# Patient Record
Sex: MALE | Race: BLACK OR AFRICAN AMERICAN | NOT HISPANIC OR LATINO | Employment: UNEMPLOYED | URBAN - METROPOLITAN AREA
[De-identification: names, ages, dates, MRNs, and addresses within clinical notes are randomized per-mention and may not be internally consistent; named-entity substitution may affect disease eponyms.]

---

## 2017-04-25 ENCOUNTER — HOSPITAL ENCOUNTER (EMERGENCY)
Facility: HOSPITAL | Age: 47
Discharge: HOME | End: 2017-04-25
Attending: EMERGENCY MEDICINE
Payer: MEDICAID

## 2017-04-25 VITALS
BODY MASS INDEX: 34.53 KG/M2 | DIASTOLIC BLOOD PRESSURE: 84 MMHG | HEIGHT: 67 IN | SYSTOLIC BLOOD PRESSURE: 156 MMHG | HEART RATE: 58 BPM | RESPIRATION RATE: 16 BRPM | TEMPERATURE: 98 F | WEIGHT: 220 LBS | OXYGEN SATURATION: 98 %

## 2017-04-25 DIAGNOSIS — M54.31 BILATERAL SCIATICA: ICD-10-CM

## 2017-04-25 DIAGNOSIS — M54.32 BILATERAL SCIATICA: ICD-10-CM

## 2017-04-25 DIAGNOSIS — S39.012A LUMBAR STRAIN, INITIAL ENCOUNTER: Primary | ICD-10-CM

## 2017-04-25 DIAGNOSIS — M54.50 LOWER BACK PAIN: ICD-10-CM

## 2017-04-25 PROCEDURE — 96372 THER/PROPH/DIAG INJ SC/IM: CPT

## 2017-04-25 PROCEDURE — 99284 EMERGENCY DEPT VISIT MOD MDM: CPT | Mod: 25

## 2017-04-25 PROCEDURE — 63600175 PHARM REV CODE 636 W HCPCS: Performed by: EMERGENCY MEDICINE

## 2017-04-25 RX ORDER — MELOXICAM 15 MG/1
15 TABLET ORAL DAILY
Qty: 7 TABLET | Refills: 0 | Status: SHIPPED | OUTPATIENT
Start: 2017-04-25 | End: 2017-05-02

## 2017-04-25 RX ORDER — METHOCARBAMOL 750 MG/1
1500 TABLET, FILM COATED ORAL EVERY 8 HOURS PRN
Qty: 20 TABLET | Refills: 0 | Status: SHIPPED | OUTPATIENT
Start: 2017-04-25 | End: 2017-05-05

## 2017-04-25 RX ORDER — KETOROLAC TROMETHAMINE 30 MG/ML
30 INJECTION, SOLUTION INTRAMUSCULAR; INTRAVENOUS
Status: COMPLETED | OUTPATIENT
Start: 2017-04-25 | End: 2017-04-25

## 2017-04-25 RX ORDER — TRAMADOL HYDROCHLORIDE 50 MG/1
50 TABLET ORAL EVERY 8 HOURS PRN
Qty: 20 TABLET | Refills: 0 | Status: SHIPPED | OUTPATIENT
Start: 2017-04-25 | End: 2017-04-28

## 2017-04-25 RX ADMIN — KETOROLAC TROMETHAMINE 30 MG: 30 INJECTION, SOLUTION INTRAMUSCULAR at 04:04

## 2017-04-25 NOTE — ED AVS SNAPSHOT
[facility]  [place] [id number]               Joseph Lai   2017  3:21 PM   ED    Description:  Male : 1970   Department:  [facility]           Your Care was Coordinated By:     Provider Role From To    Mike Pandya MD Attending Provider 17 1522 --      Reason for Visit     Back Pain     Letter for School/Work           Diagnoses this Visit        Comments    Lumbar strain, initial encounter    -  Primary     Lower back pain         Bilateral sciatica           ED Disposition     None           To Do List           Follow-up Information     Schedule an appointment as soon as possible for a visit with [facility] care physician/[facility].       These Medications        Disp Refills Start End    meloxicam (MOBIC) 15 MG tablet 7 tablet 0 2017    Take 1 tablet (15 mg total) by mouth once daily. - Oral    tramadol (ULTRAM) 50 mg tablet 20 tablet 0 2017    Take 1 tablet (50 mg total) by mouth every 8 (eight) hours as needed for Pain. - Oral    methocarbamol (ROBAXIN) 750 MG Tab 20 tablet 0 2017    Take 2 tablets (1,500 mg total) by mouth every 8 (eight) hours as needed (muscle spasm). - Oral      [facility]     [facility] -  Assistance  Unless otherwise directed by your provider, please contact [facility], our nurse care line that is available for  assistance.     Registered nurses in the [facility] provide: appointment scheduling, clinical advisement, health education, and other advisory services.  Call: [phone] (toll free)               Medications           Message regarding Medications     Verify the changes and/or additions to your medication regime listed below are the same as discussed with your clinician today.  If any of these changes or additions are incorrect, please notify your healthcare provider.    "     START taking these NEW medications        Refills    meloxicam (MOBIC) 15 MG tablet 0    Sig: Take 1 tablet (15 mg total) by mouth once daily.    Class: Print    Route: Oral    tramadol (ULTRAM) 50 mg tablet 0    Sig: Take 1 tablet (50 mg total) by mouth every 8 (eight) hours as needed for Pain.    Class: Print    Route: Oral    methocarbamol (ROBAXIN) 750 MG Tab 0    Sig: Take 2 tablets (1,500 mg total) by mouth every 8 (eight) hours as needed (muscle spasm).    Class: Print    Route: Oral      These medications were administered today        Dose Freq    ketorolac injection 30 mg 30 mg ED 1 Time    Sig: Inject 30 mg into the muscle ED 1 Time.    Class: Normal    Route: Intramuscular      STOP taking these medications     azithromycin (Z-ALANNA) 250 MG tablet Take 2 tablets by mouth on day 1; Take 1 tablet by mouth on days 2-5           Verify that the below list of medications is an accurate representation of the medications you are currently taking.  If none reported, the list may be blank. If incorrect, please contact your healthcare provider. Carry this list with you in case of emergency.           Current Medications     albuterol 90 mcg/actuation inhaler Inhale 1-2 puffs into the lungs every 4 (four) hours as needed for Wheezing or Shortness of Breath.    ketorolac injection 30 mg Inject 30 mg into the muscle ED 1 Time.    meloxicam (MOBIC) 15 MG tablet Take 1 tablet (15 mg total) by mouth once daily.    methocarbamol (ROBAXIN) 750 MG Tab Take 2 tablets (1,500 mg total) by mouth every 8 (eight) hours as needed (muscle spasm).    tramadol (ULTRAM) 50 mg tablet Take 1 tablet (50 mg total) by mouth every 8 (eight) hours as needed for Pain.           Clinical Reference Information           Your Vitals Were     BP Pulse Temp Resp Height Weight    156/84 (BP Location: Right arm, Patient Position: Sitting) 58 98 °F (36.7 °C) (Oral) 16 5' 7" (1.702 m) 99.8 kg (220 lb)    SpO2 BMI             98% 34.46 kg/m2       "   Allergies as of 4/25/2017     No Known Allergies      Immunizations Administered on Date of Encounter - 4/25/2017     None      ED Micro, Lab, POCT     None      ED Imaging Orders     Start Ordered       Status Ordering Provider    04/25/17 1531 04/25/17 1530  X-Ray Lumbar Spine Ap And Lateral  1 time imaging      In process       Discharge References/Attachments     BACK PAIN (ACUTE OR CHRONIC) (ENGLISH)    SCIATICA (ENGLISH)      [facility] Sign-Up     Activating your [facility] account is as easy as 1-2-3!     1) Visit [email or web address], select Sign Up Now, enter this activation code and your date of birth, then select Next.  [email or web address]  Expires: 6/9/2017  3:55 PM      2) Create a username and password to use when you visit [facility] in the future and select a security question in case you lose your password and select Next.    3) Enter your e-mail address and click Sign Up!    Additional Information  If you have questions, please e-mail [email or web address] or call [phone] to talk to our [facility] staff. Remember, [facility] is NOT to be used for urgent needs. For medical emergencies, dial 911.         Smoking Cessation     If you would like to quit smoking:   You may be eligible for free services if you are a [place] resident and started smoking cigarettes before September 1, 1988.  Call the Smoking Cessation Trust (SCT) toll free at (080) 744-8392 or (808) 252-4961.   Call 0-876-QUIT-NOW if you do not meet the above criteria.   Contact us via email: [email or web address]   View our website for more information: [email or web address]         [facility] complies with applicable Federal civil rights laws and does not discriminate on the basis of race, color, national origin, age, disability, or sex.        Language Assistance Services     ATTENTION: Language assistance services are available, free of charge. Please call [phone].      ATENCIÓN: Kim rutherford  tiene a cosby disposición servicios gratuitos de asistencia lingüística. Llame al [phone].     PERNELL Ý: N?u b?n nói Ti?ng Vi?t, có các d?ch v? h? tr? ngôn ng? mi?n phí jeffh cho b?n. G?i s? [phone].

## 2017-04-25 NOTE — ED PROVIDER NOTES
Encounter Date: 4/25/2017       History     Chief Complaint   Patient presents with    Back Pain     just started working for the [employer] and is having lower back pain now.. denies heavy lifting    Letter for School/Work     would like a work excuse also     Review of patient's allergies indicates:  No Known Allergies  Patient is a 46 y.o. male presenting with the following complaint: back pain. The history is provided by the patient.   Back Pain    This is a new problem. The problem occurs constantly. The pain is associated with no known injury. The quality of the pain is described as stabbing. Radiates to: posterior thighs  The pain is at a severity of 7/10. The symptoms are aggravated by bending and twisting. The pain is the same all the time. Pertinent negatives include no chest pain, no fever, no numbness, no weight loss, no headaches, no abdominal pain, no bowel incontinence, no perianal numbness, no paresthesias and no weakness. He has tried NSAIDs for the symptoms. The treatment provided no relief.     History reviewed. No pertinent past medical history.  Past Surgical History:   Procedure Laterality Date    HERNIA REPAIR       History reviewed. No pertinent family history.  Social History   Substance Use Topics    Smoking status: Current Every Day Smoker     Packs/day: 2.00     Types: Cigarettes    Smokeless tobacco: None    Alcohol use Yes      Comment: socially     Review of Systems   Constitutional: Negative for diaphoresis, fatigue, fever and weight loss.   Respiratory: Negative for shortness of breath.    Cardiovascular: Negative for chest pain.   Gastrointestinal: Negative for abdominal distention, abdominal pain, bowel incontinence, diarrhea, nausea and vomiting.   Musculoskeletal: Positive for back pain. Negative for neck pain and neck stiffness.   Allergic/Immunologic: Negative for immunocompromised state.   Neurological: Negative for seizures, weakness, numbness, headaches and paresthesias.    Hematological: Does not bruise/bleed easily.   Psychiatric/Behavioral: Negative for confusion.   All other systems reviewed and are negative.        Physical Exam   Initial Vitals   BP Pulse Resp Temp SpO2   17 1511 17 1511 17 1511 17 1511 17 1511   156/84 58 16 98 °F (36.7 °C) 98 %     Physical Exam    Nursing note and vitals reviewed.  Constitutional: He appears well-developed and well-nourished.   HENT:   Head: Normocephalic and atraumatic.   Eyes: Conjunctivae are normal. No scleral icterus.   Neck: Normal range of motion. Neck supple.   Cardiovascular: Normal rate and regular rhythm.   Pulmonary/Chest: Breath sounds normal. No stridor.   Abdominal: Soft. He exhibits no distension. There is no tenderness. There is no rebound and no guarding.   Musculoskeletal: Normal range of motion. He exhibits no edema or tenderness.        Back:         Arms:  Neurological: He is alert and oriented to person, place, and time. He has normal strength.   Skin: Skin is warm and dry.   Psychiatric: He has a normal mood and affect. Thought content normal.         ED Course   Procedures  Labs Reviewed - No data to display        Medical Decision Making:   History:   Old Medical Records: I decided to obtain old medical records.  Old Records Summarized: other records.  Independently Interpreted Test(s):   I have ordered and independently interpreted X-rays - see prior notes.  Clinical Tests:   Lab Tests: Reviewed  Radiological Study: Reviewed and Ordered    Differential Diagnosis:   Initial differential diagnosis includes but is not limited to the following... muscle spasm vs strain, sciatica, epidural abscess, unreported trauma resulting in soft tissue contusion or fracture; malingering.     Additional Medical Decision Makin-year-old male with no significant past medical history presents the emergency department complaining of low back pain radiating down posterior bilateral legs for the past  several days.  He denies any specific injury.  On exam, he has midline lumbar tenderness at L4-5 positive bilateral straight leg.  X-rays lumbar spine without evidence of acute fracture and/or subluxation.  Findings at this time are most consistent with sciatica.  He has been given IM Toradol for symptomatic relief  prior to being discharged home on Robaxin, mobic and ultram prn.  He has been advised to follow-up with his PCP within the week.  Return instructions discussed prior to discharge.                     ED Course     Clinical Impression:   The encounter diagnosis was Lower back pain.    Disposition:   Disposition: Discharged       Mike Pandya MD  04/25/17 1965

## 2017-05-09 ENCOUNTER — HOSPITAL ENCOUNTER (EMERGENCY)
Facility: HOSPITAL | Age: 47
Discharge: HOME | End: 2017-05-09
Attending: EMERGENCY MEDICINE
Payer: MEDICAID

## 2017-05-09 VITALS
RESPIRATION RATE: 18 BRPM | DIASTOLIC BLOOD PRESSURE: 87 MMHG | SYSTOLIC BLOOD PRESSURE: 141 MMHG | OXYGEN SATURATION: 96 % | WEIGHT: 220 LBS | TEMPERATURE: 98 F | BODY MASS INDEX: 33.34 KG/M2 | HEIGHT: 68 IN | HEART RATE: 89 BPM

## 2017-05-09 DIAGNOSIS — M54.30 BACK PAIN WITH SCIATICA: Primary | ICD-10-CM

## 2017-05-09 DIAGNOSIS — M54.9 BACK PAIN WITH SCIATICA: Primary | ICD-10-CM

## 2017-05-09 PROCEDURE — 99283 EMERGENCY DEPT VISIT LOW MDM: CPT | Mod: 25

## 2017-05-09 PROCEDURE — 96372 THER/PROPH/DIAG INJ SC/IM: CPT

## 2017-05-09 PROCEDURE — 63600175 PHARM REV CODE 636 W HCPCS: Performed by: EMERGENCY MEDICINE

## 2017-05-09 RX ORDER — TRIAMCINOLONE ACETONIDE 40 MG/ML
80 INJECTION, SUSPENSION INTRA-ARTICULAR; INTRAMUSCULAR
Status: COMPLETED | OUTPATIENT
Start: 2017-05-09 | End: 2017-05-09

## 2017-05-09 RX ORDER — TRAMADOL HYDROCHLORIDE 50 MG/1
50 TABLET ORAL EVERY 8 HOURS PRN
Qty: 20 TABLET | Refills: 0 | Status: SHIPPED | OUTPATIENT
Start: 2017-05-09 | End: 2017-05-12

## 2017-05-09 RX ORDER — KETOROLAC TROMETHAMINE 30 MG/ML
30 INJECTION, SOLUTION INTRAMUSCULAR; INTRAVENOUS
Status: COMPLETED | OUTPATIENT
Start: 2017-05-09 | End: 2017-05-09

## 2017-05-09 RX ORDER — ORPHENADRINE CITRATE 100 MG/1
100 TABLET, EXTENDED RELEASE ORAL 2 TIMES DAILY PRN
Qty: 20 TABLET | Refills: 0 | Status: SHIPPED | OUTPATIENT
Start: 2017-05-09 | End: 2017-05-19

## 2017-05-09 RX ADMIN — TRIAMCINOLONE ACETONIDE 80 MG: 40 INJECTION, SUSPENSION INTRA-ARTICULAR; INTRAMUSCULAR at 02:05

## 2017-05-09 RX ADMIN — KETOROLAC TROMETHAMINE 30 MG: 30 INJECTION, SOLUTION INTRAMUSCULAR at 02:05

## 2017-05-09 NOTE — ED AVS SNAPSHOT
[facility]  [address] [place] [address]               Joseph Lai   2017  1:46 PM   ED    Description:  Male : 1970   Department:  [facility]           Your Care was Coordinated By:     Provider Role From To    Mike Pandya MD Attending Provider 17 1358 17 1402    MARTIN Erickson Physician Assistant 17 1358 17 1401      Reason for Visit     Back Pain           Diagnoses this Visit        Comments    Back pain with sciatica    -  Primary       ED Disposition     ED Disposition Condition Comment    Discharge             To Do List           Follow-up Information     Schedule an appointment as soon as possible for a visit with [facility] care physician/[facility].       These Medications        Disp Refills Start End    orphenadrine (NORFLEX) 100 mg tablet 20 tablet 0 2017    Take 1 tablet (100 mg total) by mouth 2 (two) times daily as needed for Muscle spasms. - Oral    tramadol (ULTRAM) 50 mg tablet 20 tablet 0 2017    Take 1 tablet (50 mg total) by mouth every 8 (eight) hours as needed for Pain. - Oral      [facility]     [facility] Care [facility] -  Assistance  Unless otherwise directed by your provider, please contact [facility], our nurse care line that is available for  assistance.     Registered nurses in the [facility] provide: appointment scheduling, clinical advisement, health education, and other advisory services.  Call: [phone] (toll free)               Medications           Message regarding Medications     Verify the changes and/or additions to your medication regime listed below are the same as discussed with your clinician today.  If any of these changes or additions are incorrect, please notify your healthcare provider.        START taking these NEW medications        Refills    orphenadrine (NORFLEX) 100  "mg tablet 0    Sig: Take 1 tablet (100 mg total) by mouth 2 (two) times daily as needed for Muscle spasms.    Class: Print    Route: Oral    tramadol (ULTRAM) 50 mg tablet 0    Sig: Take 1 tablet (50 mg total) by mouth every 8 (eight) hours as needed for Pain.    Class: Print    Route: Oral      These medications were administered today        Dose Freq    triamcinolone acetonide injection 80 mg 80 mg ED 1 Time    Sig: Inject 2 mLs (80 mg total) into the muscle ED 1 Time.    Class: Normal    Route: Intramuscular    ketorolac injection 30 mg 30 mg ED 1 Time    Sig: Inject 30 mg into the muscle ED 1 Time.    Class: Normal    Route: Intramuscular           Verify that the below list of medications is an accurate representation of the medications you are currently taking.  If none reported, the list may be blank. If incorrect, please contact your healthcare provider. Carry this list with you in case of emergency.           Current Medications     albuterol 90 mcg/actuation inhaler Inhale 1-2 puffs into the lungs every 4 (four) hours as needed for Wheezing or Shortness of Breath.    ketorolac injection 30 mg Inject 30 mg into the muscle ED 1 Time.    orphenadrine (NORFLEX) 100 mg tablet Take 1 tablet (100 mg total) by mouth 2 (two) times daily as needed for Muscle spasms.    tramadol (ULTRAM) 50 mg tablet Take 1 tablet (50 mg total) by mouth every 8 (eight) hours as needed for Pain.    triamcinolone acetonide injection 80 mg Inject 2 mLs (80 mg total) into the muscle ED 1 Time.           Clinical Reference Information           Your Vitals Were     BP Pulse Temp Resp Height Weight    141/87 89 97.8 °F (36.6 °C) 18 5' 8" (1.727 m) 99.8 kg (220 lb)    SpO2 BMI             96% 33.45 kg/m2         Allergies as of 5/9/2017     No Known Allergies      Immunizations Administered on Date of Encounter - 5/9/2017     None      ED Micro, Lab, POCT     None      ED Imaging Orders     None      Discharge References/Attachments     " BACK PAIN (LOW) OR LEG PAIN: POSSIBLE CAUSES (ENGLISH)    BACK PAIN (ACUTE OR CHRONIC) (ENGLISH)    SCIATICA (ENGLISH)      [facility] Sign-Up     Activating your [facility] account is as easy as 1-2-3!     1) Visit [email or web address], select Sign Up Now, enter this activation code and your date of birth, then select Next.  [email or web address]  Expires: 6/9/2017  3:55 PM      2) Create a username and password to use when you visit [facility] in the future and select a security question in case you lose your password and select Next.    3) Enter your e-mail address and click Sign Up!    Additional Information  If you have questions, please e-mail [email or web address] or call [phone] to talk to our [facility] staff. Remember, [facility] is NOT to be used for urgent needs. For medical emergencies, dial 911.         Smoking Cessation     If you would like to quit smoking:   You may be eligible for free services if you are a [place] resident and started smoking cigarettes before September 1, 1988.  Call the Smoking Cessation Trust ([facility]) toll free at (187) 017-3037 or (314) 166-9984.   Call 8-800-QUIT-NOW if you do not meet the above criteria.   Contact us via email: [email or web address]   View our website for more information: [email or web address]         [facility] complies with applicable Federal civil rights laws and does not discriminate on the basis of race, color, national origin, age, disability, or sex.        Language Assistance Services     ATTENTION: Language assistance services are available, free of charge. Please call [phone].      ATENCIÓN: Si habla español, tiene a cosby disposición servicios gratuitos de asistencia lingüística. Llame al [phone].     CHÚ Ý: N?u b?n nói Ti?ng Vi?t, có các d?ch v? h? tr? ngôn ng? mi?n phí dành cho b?n. G?i s? [phone].

## 2017-05-09 NOTE — ED TRIAGE NOTES
Back pain that radiates from lower back down legs and to groin x1 month. Seen for same with no relief here before.   Denies dysuria.

## 2017-05-09 NOTE — ED PROVIDER NOTES
"Encounter Date: 5/9/2017    SCRIBE #1 NOTE: I, Hayes Hernadez, am scribing for, and in the presence of,  Mike Pandya MD. I have scribed the following portions of the note - Other sections scribed: HPI and ROS.       History     Chief Complaint   Patient presents with    Back Pain     States he has lower back pain that radiates into his legs     Review of patient's allergies indicates:  No Known Allergies  HPI Comments: CC: Back Pain     HPI: This 46 y.o M with PSHx of hernia repair presents to the ED c/o acute onset of constant and severe (9/10) lower back pain which radiates to his legs and "private area." The pt was last seen in this ED 4/25/17 for lower back pain, but his symptoms have since worsened. The pt states his Rx provided slight relief and suspects he may need a stronger dosage. The pt denies and new activity and difficulty urinating. No prior tx.     The history is provided by the patient. No  was used.     History reviewed. No pertinent past medical history.  Past Surgical History:   Procedure Laterality Date    HERNIA REPAIR       History reviewed. No pertinent family history.  Social History   Substance Use Topics    Smoking status: Current Every Day Smoker     Packs/day: 2.00     Types: Cigarettes    Smokeless tobacco: None    Alcohol use Yes      Comment: socially     Review of Systems   Constitutional: Negative for activity change and fever.   HENT: Negative for sore throat.    Respiratory: Negative for shortness of breath.    Cardiovascular: Negative for chest pain.   Gastrointestinal: Negative for nausea.   Genitourinary: Positive for testicular pain. Negative for difficulty urinating and dysuria.   Musculoskeletal: Positive for back pain (lower).        (+) bilateral leg pain    Skin: Negative for rash.   Neurological: Negative for weakness.   All other systems reviewed and are negative.      Physical Exam   Initial Vitals   BP Pulse Resp Temp SpO2   05/09/17 1341 " 05/09/17 1341 05/09/17 1341 05/09/17 1341 05/09/17 1341   141/87 89 18 97.8 °F (36.6 °C) 96 %     Physical Exam    Nursing note and vitals reviewed.  Constitutional: He appears well-developed and well-nourished.   HENT:   Head: Normocephalic and atraumatic.   Eyes: Conjunctivae are normal. No scleral icterus.   Neck: Normal range of motion. Neck supple.   Abdominal: Soft. There is no tenderness.   Musculoskeletal: Normal range of motion. He exhibits no edema.        Lumbar back: He exhibits tenderness and bony tenderness. He exhibits normal range of motion, no swelling, no edema and normal pulse.        Back:    Neurological: He is alert and oriented to person, place, and time. He has normal strength.   Skin: Skin is warm and dry.   Psychiatric: He has a normal mood and affect. Thought content normal.         ED Course   Procedures  Labs Reviewed - No data to display          Medical Decision Making:   History:   Old Medical Records: I decided to obtain old medical records.  Old Records Summarized: other records.  Clinical Tests:   Lab Tests: Reviewed  Radiological Study: Reviewed    Differential Diagnosis:   Initial differential diagnosis includes but is not limited to the following... muscle spasm vs strain, sciatica, epidural abscess, unreported trauma resulting in soft tissue contusion or fracture; malingering.      Additional Medical Decision Making:   Emergent evaluation of a 46-year-old male with no significant past medical history presents to the ED c/o low back pain radiating down the posterior of his legs x 48hrs. He denies any specific injury.  On exam, he has midline lumbar tenderness at L4-5 positive bilateral straight leg. X-rays lumbar spine reviewed from 4/28/17 are without evidence of acute fracture and/or subluxation.  Findings at this time are most consistent with lumbar strain/muscle spasm with bilateral sciatica. He has been given IM Kenalog andToradol for symptomatic relief prior to being  discharged home on Norflex and Ultram.  He has been advised to follow-up with his PCP within the week. Return instructions discussed prior to discharge.             Scribe Attestation:   Scribe #1: I performed the above scribed service and the documentation accurately describes the services I performed. I attest to the accuracy of the note.    Attending Attestation:           Physician Attestation for Scribe:  Physician Attestation Statement for Scribe #1: I,  Mike Pandya MD, reviewed documentation, as scribed by Hayes Hernadez in my presence, and it is both accurate and complete.                 ED Course     Clinical Impression:   The encounter diagnosis was Back pain with bilateral sciatica.    Disposition:   Disposition: Discharged       Mike Pandya MD  05/09/17 0912

## 2017-05-09 NOTE — ED TRIAGE NOTES
"  Lower back pain that is shoot down the legs. Pain in the testicles "feel like something hard in there" reported, denies discharge Denies fever and chills.     "

## 2022-07-04 ENCOUNTER — HOSPITAL ENCOUNTER (EMERGENCY)
Facility: HOSPITAL | Age: 52
Discharge: HOME | End: 2022-07-04
Attending: EMERGENCY MEDICINE
Payer: MEDICAID

## 2022-07-04 VITALS
WEIGHT: 210 LBS | HEIGHT: 67 IN | RESPIRATION RATE: 14 BRPM | DIASTOLIC BLOOD PRESSURE: 93 MMHG | TEMPERATURE: 98 F | HEART RATE: 86 BPM | BODY MASS INDEX: 32.96 KG/M2 | SYSTOLIC BLOOD PRESSURE: 132 MMHG | OXYGEN SATURATION: 97 %

## 2022-07-04 DIAGNOSIS — M54.42 ACUTE LEFT-SIDED LOW BACK PAIN WITH LEFT-SIDED SCIATICA: Primary | ICD-10-CM

## 2022-07-04 PROCEDURE — 99284 EMERGENCY DEPT VISIT MOD MDM: CPT | Mod: ER

## 2022-07-04 PROCEDURE — 63600175 PHARM REV CODE 636 W HCPCS: Mod: ER | Performed by: NURSE PRACTITIONER

## 2022-07-04 PROCEDURE — 96372 THER/PROPH/DIAG INJ SC/IM: CPT | Performed by: NURSE PRACTITIONER

## 2022-07-04 RX ORDER — CYCLOBENZAPRINE HCL 10 MG
10 TABLET ORAL 3 TIMES DAILY PRN
Qty: 15 TABLET | Refills: 0 | Status: SHIPPED | OUTPATIENT
Start: 2022-07-04 | End: 2022-07-09

## 2022-07-04 RX ORDER — DEXAMETHASONE SODIUM PHOSPHATE 4 MG/ML
8 INJECTION, SOLUTION INTRA-ARTICULAR; INTRALESIONAL; INTRAMUSCULAR; INTRAVENOUS; SOFT TISSUE
Status: COMPLETED | OUTPATIENT
Start: 2022-07-04 | End: 2022-07-04

## 2022-07-04 RX ORDER — KETOROLAC TROMETHAMINE 30 MG/ML
15 INJECTION, SOLUTION INTRAMUSCULAR; INTRAVENOUS
Status: COMPLETED | OUTPATIENT
Start: 2022-07-04 | End: 2022-07-04

## 2022-07-04 RX ORDER — LIDOCAINE 50 MG/G
1 PATCH TOPICAL DAILY
Qty: 15 PATCH | Refills: 0 | OUTPATIENT
Start: 2022-07-04 | End: 2022-08-16

## 2022-07-04 RX ORDER — NAPROXEN 500 MG/1
500 TABLET ORAL 2 TIMES DAILY PRN
Qty: 10 TABLET | Refills: 0 | Status: SHIPPED | OUTPATIENT
Start: 2022-07-05 | End: 2022-07-10

## 2022-07-04 RX ADMIN — KETOROLAC TROMETHAMINE 15 MG: 30 INJECTION, SOLUTION INTRAMUSCULAR at 05:07

## 2022-07-04 RX ADMIN — DEXAMETHASONE SODIUM PHOSPHATE 8 MG: 4 INJECTION INTRA-ARTICULAR; INTRALESIONAL; INTRAMUSCULAR; INTRAVENOUS; SOFT TISSUE at 05:07

## 2022-07-04 NOTE — ED PROVIDER NOTES
Encounter Date: 7/4/2022    SCRIBE #1 NOTE: I, Sally Burger, am scribing for, and in the presence of,  Rehana Ricci NP. I have scribed the following portions of the note - Other sections scribed: HPI; ROS; PE.       History     Chief Complaint   Patient presents with    Back Pain     Left side lower back pain that radiates down his left leg for over a week      Joseph Lai is a 51 y.o. male with sciatica who presents to the ED for chief complaint of left lumbar back pain radiating into the left hip and left foot onset 1 week ago. Patient states that he recently twisted his back which caused his recent sciatica flare-up. He mentions that he sometimes has pain with ambulation. Patient attempted treatment with Tylenol Extra Strength and Peggy Body and Back with no relief. He admits to taking approximately 2000 mg of the Tylenol Extra Strength and Peggy Body and Back.  No known drug allergies. He denies recent steroid use. Patient denies bowel/bladder dysfunction, dysuria, or hematuria. No further complaints at this present time.     The history is provided by the patient. No  was used.     Review of patient's allergies indicates:  No Known Allergies  No past medical history on file.  Past Surgical History:   Procedure Laterality Date    HERNIA REPAIR       No family history on file.  Social History     Tobacco Use    Smoking status: Current Every Day Smoker     Packs/day: 2.00     Types: Cigarettes   Substance Use Topics    Alcohol use: Yes     Comment: socially    Drug use: No     Review of Systems   Constitutional: Negative for fever.   HENT: Negative for ear pain.    Eyes: Negative for visual disturbance.   Respiratory: Negative for cough.    Cardiovascular: Negative for chest pain.   Gastrointestinal: Negative for vomiting.   Endocrine: Negative for polyuria.   Genitourinary: Negative for dysuria and hematuria.   Musculoskeletal: Positive for back pain.   Skin: Negative for rash.    Allergic/Immunologic: Negative for immunocompromised state.   Neurological: Negative for numbness.   Hematological: Negative for adenopathy.   Psychiatric/Behavioral: Negative for confusion.   All other systems reviewed and are negative.      Physical Exam     Initial Vitals [07/04/22 1522]   BP Pulse Resp Temp SpO2   120/80 90 14 98.1 °F (36.7 °C) 99 %      MAP       --         Physical Exam    Vitals reviewed.  Constitutional: He appears well-developed and well-nourished. He is not diaphoretic.  Non-toxic appearance. He does not have a sickly appearance. He does not appear ill. No distress.   HENT:   Head: Normocephalic and atraumatic.   Right Ear: External ear normal.   Left Ear: External ear normal.   Neck:   Normal range of motion.  Cardiovascular: Normal rate, regular rhythm, normal heart sounds and intact distal pulses.   Pulmonary/Chest: Breath sounds normal. No respiratory distress.   Abdominal: Abdomen is soft. Bowel sounds are normal. There is no abdominal tenderness.   Musculoskeletal:         General: Normal range of motion.      Cervical back: Normal and normal range of motion.      Thoracic back: Normal.      Lumbar back: Tenderness present.      Comments: Ambulatory with normal gait.  Tenderness with palpation of the paraspinal lumbar musculature.  No midline tenderness.  No rash or skin lesion.  5/5 strength of the bilateral upper lower extremities with sensation intact.  Negative straight leg raise bilaterally.     Neurological: He is alert and oriented to person, place, and time. GCS eye subscore is 4. GCS verbal subscore is 5. GCS motor subscore is 6.   Skin: Skin is warm, dry and intact. No rash noted. No erythema.   Psychiatric: He has a normal mood and affect. Thought content normal.         ED Course   Procedures  Labs Reviewed - No data to display       Imaging Results          X-Ray Hip 2 or 3 views Left (with Pelvis when performed) (Final result)  Result time 07/04/22 17:14:34    Final  result by John Ramirez MD (07/04/22 17:14:34)                 Impression:      No acute process.      Electronically signed by: John Ramirez MD  Date:    07/04/2022  Time:    17:14             Narrative:    EXAMINATION:  XR HIP WITH PELVIS WHEN PERFORMED, 2 OR 3 VIEWS LEFT    CLINICAL HISTORY:  left hip pain;    TECHNIQUE:  AP view of the pelvis and frog leg lateral view of the left hip were performed.    COMPARISON:  None    FINDINGS:  Pelvis:    The bone mineralization is within normal limits.  There is no cortical step-off.  There is no evidence of periostitis.    The joint spaces are maintained.  The soft tissues are unremarkable.  No radiopaque foreign body is identified.    There is no evidence of acute fracture or malalignment of the pelvic bones.    Left hip:    The bone mineralization is within normal limits.  There is no cortical step-off.  There is no evidence of periostitis.  The joint spaces are maintained.  The soft tissues are unremarkable.    There is no evidence of acute fracture or malalignment.                                 Medications   dexamethasone injection 8 mg (8 mg Intramuscular Given 7/4/22 1711)   ketorolac injection 15 mg (15 mg Intramuscular Given 7/4/22 1711)     Medical Decision Making:   History:   Old Medical Records: I decided to obtain old medical records.  Independently Interpreted Test(s):   I have ordered and independently interpreted X-rays - see prior notes.  Clinical Tests:   Radiological Study: Ordered and Reviewed  ED Management:    This is an evaluation of a 51 y.o. male that presents to the Emergency Department for left hip pain and left back pain.  Denies fever, rash, night sweats, weight loss, dysuria, bowel/bladder incontinence, or IV\SQ drug use. The patient is a non-toxic, afebrile, and well appearing male. Thereis no abdominal pain to palpation, CVA tenderness, saddle anesthesia. Reflexes and sensation are symmetric bilaterally.  DP pulses are symmetrical.      Vital signs reassuring. RESULTS:   X-ray left hip negative for acute fracture dislocation.    Given the above findings, I do not think the patient has acute hip or vertebral fracture, subluxation, dislocation, epidural abscess, cauda equina, shingles, UTI.    The diagnosis, treatment plan, instructions for follow-up and reevaluation with PCP as well as ED return precautions were discussed and understanding was verbalized. All questions or concerns have been addressed.             Scribe Attestation:   Scribe #1: I performed the above scribed service and the documentation accurately describes the services I performed. I attest to the accuracy of the note.               I, LETITIA Ricci, personally performed the services described in this documentation.  All medical record entries made by the scribe were at my direction and in my presence.  I have reviewed the chart and agree that the record reflects my personal performance and is accurate and complete.  Clinical Impression:   Final diagnoses:  [M54.42] Acute left-sided low back pain with left-sided sciatica (Primary)          ED Disposition Condition    Discharge Stable        ED Prescriptions     Medication Sig Dispense Start Date End Date Auth. Provider    naproxen (NAPROSYN) 500 MG tablet Take 1 tablet (500 mg total) by mouth 2 (two) times daily as needed (pain). Take with food. 10 tablet 7/5/2022 7/10/2022 Rehana Ricci NP    cyclobenzaprine (FLEXERIL) 10 MG tablet Take 1 tablet (10 mg total) by mouth 3 (three) times daily as needed for Muscle spasms. 15 tablet 7/4/2022 7/9/2022 Rehana Ricci NP    LIDOcaine (LIDODERM) 5 % Place 1 patch onto the skin once daily. Remove & Discard patch within 12 hours or as directed by MD 15 patch 7/4/2022  Rehana Ricci NP        Follow-up Information     Follow up With Specialties Details Why Contact Info    [facility]  Schedule an appointment as soon as possible for a visit in 1 day For follow-up  if you do not have a primary care doctor [address]  [place] [address]  [phone]      Salomon Donahue MD Internal Medicine Schedule an appointment as soon as possible for a visit  For follow-up [address]  [facility] [address]  [phone]      [facility] Emergency Medicine Go to  If symptoms worsen [address] [facility] [id number] [phone]           Rehana Ricci NP  07/04/22 [id number]

## 2022-07-04 NOTE — DISCHARGE INSTRUCTIONS
You have been prescribed FLEXERIL for pain. Please do not take this medication while working, drinking alcohol, swimming, or while driving/operating heavy machinery. This medication may cause drowsiness, impair judgment, and reduce physical capabilities.    You have been prescribed Naproxen for pain. This is an Non-Steroidal Anti-Inflammatory (NSAID) Medication. Please do not take any additional NSAIDs while you are taking this medication including (Advil, Aleve, Motrin, Ibuprofen, Mobic\meloxicam, Naprosyn, etc.). Please stop taking this medication if you experience: weakness, itching, yellow skin or eyes, joint pains, vomiting blood, blood or black stools, unusual weight gain, or swelling in your arms, legs, hands, or feet.     Please return to the Emergency Department for any new or worsening symptoms including:  Abdominal pain, problems going to the bathroom, numbness or tingling to your groin, going to the bathroom on yourself, fever, chest pain, shortness of breath, loss of consciousness, dizziness, weakness, or any other concerns.     Please follow up with your Primary Care Provider within the week. If you do not have one, you may contact the one listed on your discharge paperwork or you may also call the [facility] Appointment Desk at [phone] to schedule an appointment with one.     Please take all medication as prescribed.

## 2022-08-16 ENCOUNTER — HOSPITAL ENCOUNTER (EMERGENCY)
Facility: HOSPITAL | Age: 52
Discharge: HOME | End: 2022-08-16
Attending: EMERGENCY MEDICINE
Payer: MEDICAID

## 2022-08-16 VITALS
BODY MASS INDEX: 36.02 KG/M2 | HEART RATE: 73 BPM | SYSTOLIC BLOOD PRESSURE: 128 MMHG | RESPIRATION RATE: 14 BRPM | DIASTOLIC BLOOD PRESSURE: 88 MMHG | OXYGEN SATURATION: 100 % | WEIGHT: 230 LBS | TEMPERATURE: 98 F

## 2022-08-16 DIAGNOSIS — M54.32 LEFT SIDED SCIATICA: Primary | ICD-10-CM

## 2022-08-16 PROCEDURE — 63600175 PHARM REV CODE 636 W HCPCS: Mod: ER | Performed by: NURSE PRACTITIONER

## 2022-08-16 PROCEDURE — 96372 THER/PROPH/DIAG INJ SC/IM: CPT | Performed by: NURSE PRACTITIONER

## 2022-08-16 PROCEDURE — 99284 EMERGENCY DEPT VISIT MOD MDM: CPT | Mod: ER

## 2022-08-16 RX ORDER — LIDOCAINE 50 MG/G
1 PATCH TOPICAL DAILY
Qty: 15 PATCH | Refills: 0 | Status: SHIPPED | OUTPATIENT
Start: 2022-08-16 | End: 2022-08-31

## 2022-08-16 RX ORDER — METHOCARBAMOL 500 MG/1
1000 TABLET, FILM COATED ORAL 3 TIMES DAILY
Qty: 30 TABLET | Refills: 0 | Status: SHIPPED | OUTPATIENT
Start: 2022-08-16 | End: 2022-08-21

## 2022-08-16 RX ORDER — DEXAMETHASONE SODIUM PHOSPHATE 4 MG/ML
8 INJECTION, SOLUTION INTRA-ARTICULAR; INTRALESIONAL; INTRAMUSCULAR; INTRAVENOUS; SOFT TISSUE
Status: COMPLETED | OUTPATIENT
Start: 2022-08-16 | End: 2022-08-16

## 2022-08-16 RX ORDER — DEXTROMETHORPHAN HYDROBROMIDE, GUAIFENESIN 5; 100 MG/5ML; MG/5ML
650 LIQUID ORAL EVERY 8 HOURS
Qty: 20 TABLET | Refills: 0 | Status: SHIPPED | OUTPATIENT
Start: 2022-08-16 | End: 2022-08-21

## 2022-08-16 RX ORDER — IBUPROFEN 600 MG/1
600 TABLET ORAL EVERY 6 HOURS PRN
Qty: 20 TABLET | Refills: 0 | Status: SHIPPED | OUTPATIENT
Start: 2022-08-16 | End: 2022-08-23

## 2022-08-16 RX ORDER — KETOROLAC TROMETHAMINE 30 MG/ML
15 INJECTION, SOLUTION INTRAMUSCULAR; INTRAVENOUS
Status: COMPLETED | OUTPATIENT
Start: 2022-08-16 | End: 2022-08-16

## 2022-08-16 RX ADMIN — KETOROLAC TROMETHAMINE 15 MG: 30 INJECTION, SOLUTION INTRAMUSCULAR at 09:08

## 2022-08-16 RX ADMIN — DEXAMETHASONE SODIUM PHOSPHATE 8 MG: 4 INJECTION, SOLUTION INTRAMUSCULAR; INTRAVENOUS at 09:08

## 2022-08-16 NOTE — Clinical Note
"Joseph"Cassandra Lai was seen and treated in our emergency department on 8/16/2022.  He may return to work on 08/17/2022.       If you have any questions or concerns, please don't hesitate to call.      BASSAM Alaniz"

## 2022-08-16 NOTE — ED TRIAGE NOTES
Joseph Lai, a 52 y.o. male presents to the ED via PV with CC of L side sciatica nerve pain that is getting worse.Pt states he was diagnosed with this 6-7 years ago. Pt reports numbness and tingling to the L side of his leg and hip

## 2022-08-16 NOTE — ED PROVIDER NOTES
"Encounter Date: 8/16/2022    SCRIBE #1 NOTE: I, Wendy Maderazonia, am scribing for, and in the presence of,  BASSAM Araujo. I have scribed the following portions of the note - the EKG reading. Other sections scribed: HPI, ROS, PE.       History     Chief Complaint   Patient presents with    Sciatica     Pt states," I have sciatica again on the left side."     52 year old male with no known medical problems presents to the ED with complaints of Sciatica nerve pain that is 'similar to previous pain'. Pt visited ED for the same complaint on 7/4/22. Patient denies new trauma, injury, rash, fever, chest pain, SOB, numbness, weakness, tingling, abdominal pain, dysuria, hematuria, nausea, vomiting, diarrhea, or any other complaints. Patient rates his pain as 7/10 and has not taken any medications for the symptoms PTA today. Pt states he has previously taken extra strength Tylenol for pain with some relief.  No alleviating/aggravating factors. Pt notes he has not followed up with Orthopedics for his Sciatica pain. Endorses cigarette and alcohol use. Does not endorse drug use. No known allergies.         The history is provided by the patient. No  was used.     Review of patient's allergies indicates:  No Known Allergies  History reviewed. No pertinent past medical history.  Past Surgical History:   Procedure Laterality Date    HERNIA REPAIR       History reviewed. No pertinent family history.  Social History     Tobacco Use    Smoking status: Current Every Day Smoker     Packs/day: 2.00     Types: Cigarettes    Smokeless tobacco: Never Used   Substance Use Topics    Alcohol use: Yes     Comment: socially    Drug use: No     Review of Systems   Constitutional: Negative for chills and fever.   HENT: Negative for congestion, ear pain, rhinorrhea and sore throat.    Eyes: Negative for pain, discharge and redness.   Respiratory: Negative for cough and shortness of breath.    Cardiovascular: Negative " for chest pain.   Gastrointestinal: Negative for abdominal pain, diarrhea, nausea and vomiting.   Genitourinary: Negative for dysuria.   Musculoskeletal: Positive for arthralgias and back pain. Negative for neck pain and neck stiffness.   Skin: Negative for rash.   Neurological: Negative for dizziness, weakness, light-headedness, numbness and headaches.   Psychiatric/Behavioral: Negative for confusion.       Physical Exam     Initial Vitals [08/16/22 0835]   BP Pulse Resp Temp SpO2   134/83 86 16 98 °F (36.7 °C) 99 %      MAP       --         Physical Exam    Nursing note and vitals reviewed.  Constitutional: Vital signs are normal. He appears well-developed and well-nourished. He is cooperative.  Non-toxic appearance. He does not appear ill.   HENT:   Head: Normocephalic and atraumatic.   Right Ear: External ear normal.   Left Ear: External ear normal.   Nose: Nose normal.   Eyes: Conjunctivae and EOM are normal. Pupils are equal, round, and reactive to light.   Neck: Neck supple.   Normal range of motion.  Cardiovascular: Normal rate and regular rhythm.   Pulmonary/Chest: Effort normal and breath sounds normal.   Abdominal: Abdomen is soft. There is no abdominal tenderness. There is no rebound and no guarding.   Musculoskeletal:      Cervical back: Normal, normal range of motion and neck supple.      Thoracic back: Normal.      Lumbar back: No bony tenderness. Normal range of motion. Positive left straight leg raise test.      Comments: Left SI joint tenderness and positive SLR on the left. No spinal tenderness and no step-offs. Normal gait and normal ROM. Normal strength and sensation. No saddle anesthesia. No Erythema, bruising, rash, or obvious deformity      Neurological: He is alert and oriented to person, place, and time. He has normal strength. No cranial nerve deficit or sensory deficit. Gait normal. GCS eye subscore is 4. GCS verbal subscore is 5. GCS motor subscore is 6.   Skin: Skin is warm, dry and  intact. No rash noted.   Psychiatric: He has a normal mood and affect. His speech is normal and behavior is normal. Thought content normal.         ED Course   Procedures  Labs Reviewed - No data to display       Imaging Results    None          Medications   ketorolac injection 15 mg (15 mg Intramuscular Given 8/16/22 0908)   dexamethasone injection 8 mg (8 mg Intramuscular Given 8/16/22 0908)           APC / Resident Notes:   This is an evaluation of a 52 y.o. male that presents to the Emergency Department for left-sided sciatica pain    Physical Exam shows a non-toxic, afebrile, and well appearing male. Left SI joint tenderness and positive SLR on the left. No spinal tenderness and no step-offs. Normal gait and normal ROM. Normal strength and sensation. No saddle anesthesia. No Erythema, bruising, rash, or obvious deformity     Vital signs are reassuring. If available, previous records reviewed.   RESULTS: Reviewed Xray 7/4/2022, no acute injury    My overall impression is Left-sided sciatica. I considered, but at this time, do not suspect fracture, dislocation, shingles, cellulitis.    ED Course: Toradol, Decadron. Discharge Meds/Instructions: tylenol, Ibuprofen, Robaxin, Lidoderm, Ortho referral. The diagnosis, treatment plan, instructions for follow-up as well as ED return precautions were discussed. All questions have been answered.          Scribe Attestation:   Scribe #1: I performed the above scribed service and the documentation accurately describes the services I performed. I attest to the accuracy of the note.               I, SANJAY Gilbert personally performed the services described in this documentation.  All medical record entries made by the scribe were at my direction and in my presence.  I have reviewed the chart and agree that the record reflects my personal performance and is accurate and complete.  Clinical Impression:   Final diagnoses:  [M54.32] Left sided sciatica (Primary)          ED  Disposition Condition    Discharge Stable        ED Prescriptions     Medication Sig Dispense Start Date End Date Auth. Provider    acetaminophen (TYLENOL) 650 MG TbSR Take 1 tablet (650 mg total) by mouth every 8 (eight) hours. for 5 days 20 tablet 8/16/2022 8/21/2022 BASSAM Alaniz    ibuprofen (ADVIL,MOTRIN) 600 MG tablet Take 1 tablet (600 mg total) by mouth every 6 (six) hours as needed for Pain. 20 tablet 8/16/2022 8/21/2022 BASSAM Alaniz    methocarbamoL (ROBAXIN) 500 MG Tab Take 2 tablets (1,000 mg total) by mouth 3 (three) times daily. for 5 days 30 tablet 8/16/2022 8/21/2022 BASSAM Alaniz    LIDOcaine (LIDODERM) 5 % Place 1 patch onto the skin once daily. Remove & Discard patch within 12 hours or as directed by MD, remove prior to bedtime for 15 days 15 patch 8/16/2022 8/31/2022 BASSAM Alaniz        Follow-up Information     Follow up With Specialties Details Why Contact Info    Corinne Dash PA-C Neurosurgery Schedule an appointment as soon as possible for a visit in 2 days  [address] [facility] [address]  [address]  [facility] [address]  [phone]      [facility] - Musculosketetal Neuromusculoskeletal Medicine Schedule an appointment as soon as possible for a visit in 2 days  [address] [facility] [address]  [phone]      [facility] ED Emergency Medicine Go to  If symptoms worsen [address] [facility] [address] [phone]           BASSAM Alaniz  08/16/22 0999

## 2022-08-22 ENCOUNTER — TELEPHONE (OUTPATIENT)
Dept: NEUROSURGERY | Facility: CLINIC | Age: 52
End: 2022-08-22
Payer: MEDICAID

## 2022-08-22 NOTE — TELEPHONE ENCOUNTER
Called patient to schedule neurosurgery follow up from the emergency department. Patient was informed that his appointment is for Tuesday, 8/23/22 at 10:00 AM. Patient verbally acknowledged and agreed.      ----- Message from Jd Gustafson MA sent at 8/22/2022  8:11 AM CDT -----  Type: Patient Call Back    Who called: Self    What is the request in detail: Pt. Was seen in the Ed and was told he needs a follow up with this provider ..     Can the clinic reply by [facility]?no    Would the patient rather a call back or a response via [facility]? yes    Best call back number: [phone] (home)

## 2022-08-23 ENCOUNTER — OFFICE VISIT (OUTPATIENT)
Dept: NEUROSURGERY | Facility: CLINIC | Age: 52
End: 2022-08-23
Payer: MEDICAID

## 2022-08-23 ENCOUNTER — TELEPHONE (OUTPATIENT)
Dept: NEUROSURGERY | Facility: CLINIC | Age: 52
End: 2022-08-23

## 2022-08-23 ENCOUNTER — HOSPITAL ENCOUNTER (OUTPATIENT)
Dept: RADIOLOGY | Facility: HOSPITAL | Age: 52
Discharge: HOME | End: 2022-08-23
Attending: PHYSICIAN ASSISTANT
Payer: MEDICAID

## 2022-08-23 VITALS
HEART RATE: 80 BPM | BODY MASS INDEX: 34.6 KG/M2 | WEIGHT: 220.44 LBS | HEIGHT: 67 IN | DIASTOLIC BLOOD PRESSURE: 83 MMHG | SYSTOLIC BLOOD PRESSURE: 125 MMHG | OXYGEN SATURATION: 99 %

## 2022-08-23 DIAGNOSIS — M54.42 ACUTE LEFT-SIDED LOW BACK PAIN WITH LEFT-SIDED SCIATICA: Primary | ICD-10-CM

## 2022-08-23 DIAGNOSIS — M54.42 ACUTE LEFT-SIDED LOW BACK PAIN WITH LEFT-SIDED SCIATICA: ICD-10-CM

## 2022-08-23 PROBLEM — E78.5 HYPERLIPEMIA: Status: ACTIVE | Noted: 2019-08-10

## 2022-08-23 PROBLEM — I10 HTN (HYPERTENSION): Status: ACTIVE | Noted: 2019-08-10

## 2022-08-23 PROCEDURE — 99203 PR OFFICE/OUTPT VISIT, NEW, LEVL III, 30-44 MIN: ICD-10-PCS | Mod: S$PBB,,, | Performed by: PHYSICIAN ASSISTANT

## 2022-08-23 PROCEDURE — 99214 OFFICE O/P EST MOD 30 MIN: CPT | Mod: PBBFAC | Performed by: PHYSICIAN ASSISTANT

## 2022-08-23 PROCEDURE — 72114 XR LUMBAR SPINE 5 VIEW WITH FLEX AND EXT: ICD-10-PCS | Mod: 26,,, | Performed by: RADIOLOGY

## 2022-08-23 PROCEDURE — 1159F PR MEDICATION LIST DOCUMENTED IN MEDICAL RECORD: ICD-10-PCS | Mod: CPTII,,, | Performed by: PHYSICIAN ASSISTANT

## 2022-08-23 PROCEDURE — 3008F BODY MASS INDEX DOCD: CPT | Mod: CPTII,,, | Performed by: PHYSICIAN ASSISTANT

## 2022-08-23 PROCEDURE — 1159F MED LIST DOCD IN RCRD: CPT | Mod: CPTII,,, | Performed by: PHYSICIAN ASSISTANT

## 2022-08-23 PROCEDURE — 3074F PR MOST RECENT SYSTOLIC BLOOD PRESSURE < 130 MM HG: ICD-10-PCS | Mod: CPTII,,, | Performed by: PHYSICIAN ASSISTANT

## 2022-08-23 PROCEDURE — 3079F DIAST BP 80-89 MM HG: CPT | Mod: CPTII,,, | Performed by: PHYSICIAN ASSISTANT

## 2022-08-23 PROCEDURE — 99999 PR PBB SHADOW E&M-EST. PATIENT-LVL IV: ICD-10-PCS | Mod: PBBFAC,,, | Performed by: PHYSICIAN ASSISTANT

## 2022-08-23 PROCEDURE — 3008F PR BODY MASS INDEX (BMI) DOCUMENTED: ICD-10-PCS | Mod: CPTII,,, | Performed by: PHYSICIAN ASSISTANT

## 2022-08-23 PROCEDURE — 72114 X-RAY EXAM L-S SPINE BENDING: CPT | Mod: 26,,, | Performed by: RADIOLOGY

## 2022-08-23 PROCEDURE — 1160F PR REVIEW ALL MEDS BY PRESCRIBER/CLIN PHARMACIST DOCUMENTED: ICD-10-PCS | Mod: CPTII,,, | Performed by: PHYSICIAN ASSISTANT

## 2022-08-23 PROCEDURE — 3079F PR MOST RECENT DIASTOLIC BLOOD PRESSURE 80-89 MM HG: ICD-10-PCS | Mod: CPTII,,, | Performed by: PHYSICIAN ASSISTANT

## 2022-08-23 PROCEDURE — 1160F RVW MEDS BY RX/DR IN RCRD: CPT | Mod: CPTII,,, | Performed by: PHYSICIAN ASSISTANT

## 2022-08-23 PROCEDURE — 99203 OFFICE O/P NEW LOW 30 MIN: CPT | Mod: S$PBB,,, | Performed by: PHYSICIAN ASSISTANT

## 2022-08-23 PROCEDURE — 72114 X-RAY EXAM L-S SPINE BENDING: CPT | Mod: TC,FY

## 2022-08-23 PROCEDURE — 3074F SYST BP LT 130 MM HG: CPT | Mod: CPTII,,, | Performed by: PHYSICIAN ASSISTANT

## 2022-08-23 PROCEDURE — 99999 PR PBB SHADOW E&M-EST. PATIENT-LVL IV: CPT | Mod: PBBFAC,,, | Performed by: PHYSICIAN ASSISTANT

## 2022-08-23 RX ORDER — MIRTAZAPINE 30 MG/1
30 TABLET, FILM COATED ORAL
COMMUNITY

## 2022-08-23 RX ORDER — HYDROXYZINE PAMOATE 25 MG/1
25 CAPSULE ORAL 3 TIMES DAILY PRN
COMMUNITY

## 2022-08-23 RX ORDER — OLANZAPINE 2.5 MG/1
5 TABLET ORAL
COMMUNITY

## 2022-08-23 RX ORDER — DULOXETIN HYDROCHLORIDE 30 MG/1
30 CAPSULE, DELAYED RELEASE ORAL
COMMUNITY

## 2022-08-23 RX ORDER — METHOCARBAMOL 750 MG/1
750 TABLET, FILM COATED ORAL EVERY 8 HOURS PRN
Qty: 60 TABLET | Refills: 0 | Status: SHIPPED | OUTPATIENT
Start: 2022-08-23 | End: 2022-09-22

## 2022-08-23 NOTE — TELEPHONE ENCOUNTER
Attempted to call patient with no response. Unable to LVM.      ----- Message from Rema Mckenzie MA sent at 8/23/2022  1:09 PM CDT -----    ----- Message -----  From: Corinne Dash PA-C  Sent: 8/23/2022   1:08 PM CDT  To: Rema Mckenzie MA    Please call patient. Xrays reviewed and revealed no fractures or instability. Continue with plan for PT and FU in 3 months.

## 2022-08-23 NOTE — TELEPHONE ENCOUNTER
Called patient and informed him that Corinne reviewed Xrays which revealed no fractures or instability. He was also informed to continue the plan for PT and to F/U in 3 months. Patient verbally acknowledged and agreed.        ----- Message from Rema Mckenzie MA sent at 8/23/2022  1:09 PM CDT -----    ----- Message -----  From: Corinne Dash PA-C  Sent: 8/23/2022   1:08 PM CDT  To: Rema Mckenzie MA    Please call patient. Xrays reviewed and revealed no fractures or instability. Continue with plan for PT and FU in 3 months.

## 2022-08-23 NOTE — Clinical Note
Please call patient. Xrays reviewed and revealed no fractures or instability. Continue with plan for PT and FU in 3 months.

## 2022-08-23 NOTE — PROGRESS NOTES
[facility]  Neurosurgery    SUBJECTIVE:     History of Present Illness:  Joseph Lai is a 52 y.o. male with HTN and HLD who presents with acute left low back and left leg pain.  He reports similar symptoms that occurred 8-9 years ago, described as sciatica.  At that time, he was in pain for several months before his symptoms resolved.  He does report advanced imaging taken at that time, but he does not have it with him.  Was not offered surgery at that time.  Regarding this current episode, symptoms began 4-6 weeks ago and are present from the left low back radiating through his buttocks and down his lateral and posterior leg to the top of his foot.  He denies right leg pain.  He does report some intermittent numbness in his left > right foot.  He has had no recent imaging of his lumbar spine, physical therapy, were [facility].    He reports chronic incomplete emptying of his bladder.  Denies bowel dysfunction.  He reports a few incidents of right arm numbness associated with activities that resolves on its own with massage and time.  When the right arm numbness was 1st noticed 8-9 years ago, it lasted for a few months and then resolved on its own.    Of note, patient reports needing to see his cardiologist for some medication for heart pain.  Pain was present few days ago and is not present today.  He does not recall the name of the medication he was given in the past nor does he recall we will kind of problems he has with his heart.      (Not in a hospital admission)      Review of patient's allergies indicates:  No Known Allergies    History reviewed. No pertinent past medical history.  Past Surgical History:   Procedure Laterality Date    HERNIA REPAIR       History reviewed. No pertinent family history.  Social History     Tobacco Use    Smoking status: Current Every Day Smoker     Packs/day: 2.00     Types: Cigarettes    Smokeless tobacco: Never Used   Substance Use Topics    Alcohol use: Yes      Comment: socially    Drug use: No        Review of Systems:  As noted in HPI    OBJECTIVE:     Vital Signs (Most Recent):  Pulse: 80 (08/23/22 1007)  BP: 125/83 (08/23/22 1007)  SpO2: 99 % (08/23/22 1007)    Physical Exam:  General: well developed, well nourished, no distress  Head: normocephalic, atraumatic  Neurologic: Alert and oriented. Thought content appropriate  GCS: Motor: 6/Verbal: 5/Eyes: 4 GCS Total: 15  Language: No aphasia  Speech: No dysarthria  Cranial nerves: face symmetric, tongue midline, CN II-XII grossly intact.   Eyes: pupils equal, round, reactive to light with accommodation, EOMI.   Pulmonary: normal respirations, not labored, no accessory muscles used  Sensory: intact to light touch throughout  Motor Strength: Moves all extremities spontaneously with good tone.  Full strength upper and lower extremities. No abnormal movements seen.     Strength  Deltoids Triceps Biceps Wrist Extension Wrist Flexion Hand    Upper: R 5/5 5/5 5/5 5/5 5/5 5/5    L 5/5 5/5 5/5 5/5 5/5 5/5     Iliopsoas Quadriceps Knee  Flexion Tibialis  anterior Gastro- cnemius EHL   Lower: R 5/5 5/5 5/5 5/5 5/5 5/5    L 5/5 5/5 5/5 5/5 5/5 5/5     DTR's - absent patellar reflexes bilaterally  Bauman: absent  Clonus: absent    Skin: warm, dry and intact, no rashes  Gait: normal    SI Joint tenderness:  Mildly positive on left     Diagnostic Results:  No spinal imaging for review  Recent hip x-rays revealed no significant arthritis or fractures       ASSESSMENT/PLAN:     Joseph Lai is a 52 y.o. male who presents with acute left low back and left leg pain.  He is neurologically intact on exam.  He comes with no prior imaging.  I recommend a trial of conservative treatments including physical therapy with follow-up in 3 months.  We discussed a prescription for Mobic, but he reports GI upset with anti-inflammatories.  I advised him to follow-up with PCP for appropriate pain control.  Refill for Robaxin sent to patient's pharmacy;  reviewed prescription instructions.    Patient is visibly upset with this treatment plan and was hopeful for surgery.  I explained that this is an acute problem with no objective nerve deficits.  Surgery in this setting could do more harm than good.  Lumbar x-rays ordered to rule out fractures or instability.    Update:  Lumbar x-rays reviewed and revealed no fractures or instability.  No spondylolisthesis.  There are 5 non-rib-bearing lumbar vertebrae.      Please feel free to call with any further questions        Corinne Dash PA-C  [facility]  Department of Neurosurgery  [phone]    Disclaimer: This note was dictated by speech recognition. Minor errors in transcription may be present.  Please call with any questions.